# Patient Record
Sex: MALE | Race: WHITE | NOT HISPANIC OR LATINO | URBAN - METROPOLITAN AREA
[De-identification: names, ages, dates, MRNs, and addresses within clinical notes are randomized per-mention and may not be internally consistent; named-entity substitution may affect disease eponyms.]

---

## 2022-08-24 ENCOUNTER — HOSPITAL ENCOUNTER (EMERGENCY)
Facility: OTHER | Age: 57
Discharge: HOME | End: 2022-08-24
Attending: EMERGENCY MEDICINE

## 2022-08-24 VITALS
HEIGHT: 72 IN | BODY MASS INDEX: 27.09 KG/M2 | HEART RATE: 72 BPM | SYSTOLIC BLOOD PRESSURE: 177 MMHG | DIASTOLIC BLOOD PRESSURE: 104 MMHG | WEIGHT: 200 LBS | TEMPERATURE: 98 F | OXYGEN SATURATION: 96 % | RESPIRATION RATE: 18 BRPM

## 2022-08-24 DIAGNOSIS — S61.213A LACERATION OF LEFT MIDDLE FINGER WITHOUT FOREIGN BODY WITHOUT DAMAGE TO NAIL, INITIAL ENCOUNTER: Primary | ICD-10-CM

## 2022-08-24 PROCEDURE — 12002 RPR S/N/AX/GEN/TRNK2.6-7.5CM: CPT

## 2022-08-24 PROCEDURE — 99284 EMERGENCY DEPT VISIT MOD MDM: CPT | Mod: 25

## 2022-08-24 PROCEDURE — 63600175 PHARM REV CODE 636 W HCPCS: Performed by: PHYSICIAN ASSISTANT

## 2022-08-24 PROCEDURE — 90471 IMMUNIZATION ADMIN: CPT | Performed by: PHYSICIAN ASSISTANT

## 2022-08-24 PROCEDURE — 25000003 PHARM REV CODE 250: Performed by: PHYSICIAN ASSISTANT

## 2022-08-24 PROCEDURE — 90715 TDAP VACCINE 7 YRS/> IM: CPT | Performed by: PHYSICIAN ASSISTANT

## 2022-08-24 RX ORDER — LIDOCAINE HYDROCHLORIDE 10 MG/ML
5 INJECTION INFILTRATION; PERINEURAL
Status: COMPLETED | OUTPATIENT
Start: 2022-08-24 | End: 2022-08-24

## 2022-08-24 RX ORDER — NAPROXEN 500 MG/1
500 TABLET ORAL
Status: COMPLETED | OUTPATIENT
Start: 2022-08-24 | End: 2022-08-24

## 2022-08-24 RX ADMIN — LIDOCAINE HYDROCHLORIDE 5 ML: 10 INJECTION, SOLUTION INFILTRATION; PERINEURAL at 06:08

## 2022-08-24 RX ADMIN — NAPROXEN 500 MG: 500 TABLET ORAL at 06:08

## 2022-08-24 RX ADMIN — CLOSTRIDIUM TETANI TOXOID ANTIGEN (FORMALDEHYDE INACTIVATED), CORYNEBACTERIUM DIPHTHERIAE TOXOID ANTIGEN (FORMALDEHYDE INACTIVATED), BORDETELLA PERTUSSIS TOXOID ANTIGEN (GLUTARALDEHYDE INACTIVATED), BORDETELLA PERTUSSIS FILAMENTOUS HEMAGGLUTININ ANTIGEN (FORMALDEHYDE INACTIVATED), BORDETELLA PERTUSSIS PERTACTIN ANTIGEN, AND BORDETELLA PERTUSSIS FIMBRIAE 2/3 ANTIGEN 0.5 ML: 5; 2; 2.5; 5; 3; 5 INJECTION, SUSPENSION INTRAMUSCULAR at 06:08

## 2022-08-24 NOTE — ED NOTES
Pt ambulated to rm 17 with steady gait, pt is AAOX4, even unlabored resp noted, VSS, NADN. Pt c/o laceration to L 3dr digit, at tip. Pt cut it with knife. +active bleeding, pressure dressing applied.

## 2022-08-24 NOTE — FIRST PROVIDER EVALUATION
Emergency Department TeleTriage Encounter Note      CHIEF COMPLAINT    Chief Complaint   Patient presents with    Laceration     Pt c.o laceration to tip of left 3 rd finger approx 1 hr ago while chopping vegetables.  Pt has u shaped laceration at tip of 3rd left finger near complete.  Bleeding present but controlled with pressure bandage.  AAO x 3 nadn skin w.d  pt had tetanus approx 8 years ago         VITAL SIGNS   Initial Vitals [08/24/22 1659]   BP Pulse Resp Temp SpO2   (!) 144/87 84 18 98.1 °F (36.7 °C) 98 %      MAP       --            ALLERGIES    Review of patient's allergies indicates:  No Known Allergies    PROVIDER TRIAGE NOTE  This is a teletriage evaluation of a 57 y.o. male presenting to the ED with c/o accidental laceration to left fingerpads with knife PTA. Unsure last Tdap.     PE: fingers wrapped. Non-toxic/well-appearing. No respiratory distress, speaks in full sentences without issue. No active emesis nor cough. Normal eye contact and mentation.     Plan: XR, meds, tdap. Further/augmented workup at discretion of examining provider.     All ED beds are full at present; patient notified of this status.  Patient seen and medically screened by S[facility] via teletriage. Orders initiated at triage to expedite care.  Patient is stable and will be placed in an ED bed when available.  Care will be transferred to an alternate provider when patient has been placed in an Exam Room further exam, additional orders, and disposition.         ORDERS  Labs Reviewed - No data to display    ED Orders (720h ago, onward)    Start Ordered     Status Ordering Provider    08/24/22 1836 08/24/22 1736  Tdap vaccine injection 0.5 mL  vaccine x 1 dose         Ordered SCOOBY ANDERSON    08/24/22 1745 08/24/22 1735  LIDOcaine HCL 10 mg/ml (1%) injection 5 mL  ED 1 Time         Ordered SCOOBY ANDERSON    08/24/22 1736 08/24/22 1736  X-Ray Hand 3 view Left  1 time imaging         Ordered SCOOBY ANDERSON            Virtual  Visit Note: The provider triage portion of this emergency department evaluation and documentation was performed via [employer], a HIPAA-compliant telemedicine application, in concert with a tele-presenter in the room. A face to face patient evaluation with one of my colleagues will occur once the patient is placed in an emergency department room.      DISCLAIMER: This note was prepared with [employer] voice recognition transcription software. Garbled syntax, mangled pronouns, and other bizarre constructions may be attributed to that software system.

## 2022-08-25 NOTE — ED PROVIDER NOTES
Source of History:  Patient    Chief complaint:  Laceration (Pt c.o laceration to tip of left 3 rd finger approx 1 hr ago while chopping vegetables.  Pt has u shaped laceration at tip of 3rd left finger near complete.  Bleeding present but controlled with pressure bandage.  AAO x 3 nadn skin w.d  pt had tetanus approx 8 years ago  )      HPI:  Catarina Gifford is a 57 y.o. male who is presenting to the emergency department with a laceration approximately 1 hour prior to arrival.  Patient sustained a laceration to his left middle finger while cutting vegetables at work.  He states he was unable to stop the bleeding with compression so he applied a coagulant spray which still did not stop the bleeding.  He denies numbness or pain with range of motion.  He is right-hand dominant.  This is the extent to the patients complaints today here in the emergency department.    ROS: As per HPI and below:  General: No fever.  No chills.  No fatigue.  Neurologic: No focal weakness.  No numbness.  MSK: no back pain.  Integument: + laceration to left middle finger  Allergy/immunology:  Not immunocompromised.    Review of patient's allergies indicates:  No Known Allergies    PMH:  As per HPI and below:  No past medical history on file.  No past surgical history on file.         Physical Exam:    BP (!) 177/104 (BP Location: Left arm, Patient Position: Sitting)   Pulse 72   Temp 98.1 °F (36.7 °C) (Oral)   Resp 18   Ht 6' (1.829 m)   Wt 90.7 kg (200 lb)   SpO2 96%   BMI 27.12 kg/m²   Nursing note and vital signs reviewed.  Appearance: No acute distress.  Eyes: No conjunctival injection.  Chest/ Respiratory:  No respiratory distress  Cardiovascular: 2+ radial pulse to left upper extremity  Musculoskeletal:  Right 3rd digit has normal range of motion.  No obvious bony deformity.  Skin:  3 cm, U shaped flap laceration with subcutaneous tissue edema, actively bleeding to left 3rd digit.  Cap refill less than 3 seconds.  Neurologic:  Motor intact.  Sensation intact.  Mental Status:  Alert and oriented x 3.  Appropriate, conversant.  Labs that have been ordered have been independently reviewed and interpreted by myself.    Lac Repair    Date/Time: 8/24/2022 8:24 PM  Performed by: Luke Cochran PA-C  Authorized by: Solomon Lane DO     Consent:     Risks discussed:  Poor wound healing  Anesthesia:     Anesthesia method:  Nerve block    Block needle gauge:  25 G    Block anesthetic:  Lidocaine 1% w/o epi  Laceration details:     Location:  Finger    Finger location:  L long finger    Length (cm):  3  Pre-procedure details:     Preparation:  Patient was prepped and draped in usual sterile fashion  Exploration:     Imaging obtained: x-ray      Imaging outcome: foreign body not noted      Wound extent: no nerve damage noted, no tendon damage noted and no underlying fracture noted    Treatment:     Wound cleansed with: Sea[employer]ens.  Skin repair:     Repair method:  Sutures    Suture size:  4-0    Suture material:  Nylon    Number of sutures:  7  Approximation:     Approximation:  Close  Post-procedure details:     Dressing:  Non-adherent dressing    Procedure completion:  Tolerated well, no immediate complications        I decided to obtain the patient's medical records.    MDM/ Differential Dx:    57 y.o. male who is presenting to the emergency department with a laceration to his left middle finger.  Limb is distally neurovascular intact.  Laceration repair described in procedure note.  Patient advised on supportive care, wound care instructions.               Diagnostic Impression:    1. Laceration of left middle finger without foreign body without damage to nail, initial encounter         Luke Cochran PA-C  08/24/22 2026